# Patient Record
Sex: FEMALE | Race: BLACK OR AFRICAN AMERICAN | NOT HISPANIC OR LATINO | URBAN - METROPOLITAN AREA
[De-identification: names, ages, dates, MRNs, and addresses within clinical notes are randomized per-mention and may not be internally consistent; named-entity substitution may affect disease eponyms.]

---

## 2018-11-17 ENCOUNTER — EMERGENCY (EMERGENCY)
Facility: HOSPITAL | Age: 53
LOS: 1 days | Discharge: HOME | End: 2018-11-17
Attending: EMERGENCY MEDICINE | Admitting: EMERGENCY MEDICINE
Payer: SELF-PAY

## 2018-11-17 VITALS
DIASTOLIC BLOOD PRESSURE: 80 MMHG | HEART RATE: 58 BPM | SYSTOLIC BLOOD PRESSURE: 119 MMHG | OXYGEN SATURATION: 98 % | TEMPERATURE: 98 F | RESPIRATION RATE: 16 BRPM

## 2018-11-17 VITALS
RESPIRATION RATE: 17 BRPM | DIASTOLIC BLOOD PRESSURE: 74 MMHG | HEART RATE: 79 BPM | OXYGEN SATURATION: 100 % | SYSTOLIC BLOOD PRESSURE: 127 MMHG | TEMPERATURE: 98 F

## 2018-11-17 PROCEDURE — 99285 EMERGENCY DEPT VISIT HI MDM: CPT | Mod: 25

## 2018-11-17 PROCEDURE — 93010 ELECTROCARDIOGRAM REPORT: CPT

## 2018-11-17 NOTE — ED PROVIDER NOTE - PROGRESS NOTE DETAILS
PA CARMEN: Patient reassessed, sitting comfortably in chair in NAD, denies any complaints. States feeling better, symptoms improved. Discussed with attending, patient can be discharged home to f/u with PCP. The patient was given verbal and written discharge instructions. Specifically, instructions when to return to the ED and when to seek follow-up from their pcp was discussed. Any specialty follow-up was discussed, including how to make an appointment.  Instructions were discussed in simple, plain language and was understood by the patient. The patient understands that should their symptoms worsen or any new symptoms arise, they should return to the ED immediately for further evaluation. All pt's questions were answered. Patient verbalizes understanding. PA CARMEN: Patient reassessed, sitting comfortably in chair in NAD, denies any complaints. States feeling better, symptoms improved. Discussed with attending, patient can be discharged home to f/u with PCP. Recommends increases fiber, more vegetables and fruits, Miralax, metamucil for constipation. The patient was given verbal and written discharge instructions. Specifically, instructions when to return to the ED and when to seek follow-up from their pcp was discussed. Any specialty follow-up was discussed, including how to make an appointment.  Instructions were discussed in simple, plain language and was understood by the patient. The patient understands that should their symptoms worsen or any new symptoms arise, they should return to the ED immediately for further evaluation. All pt's questions were answered. Patient verbalizes understanding.

## 2018-11-17 NOTE — ED ADULT TRIAGE NOTE - CHIEF COMPLAINT QUOTE
Pt states that she had a syncopal episode after having a BM.  Pt states that she has been constipated x 2 days and took a laxative today.  Denies PMH

## 2018-11-17 NOTE — ED ADULT NURSE NOTE - NSIMPLEMENTINTERV_GEN_ALL_ED
Implemented All Universal Safety Interventions:  [place] to call system. Call bell, personal items and telephone within reach. Instruct patient to call for assistance. Room bathroom lighting operational. Non-slip footwear when patient is off stretcher. Physically safe environment: no spills, clutter or unnecessary equipment. Stretcher in lowest position, wheels locked, appropriate side rails in place.

## 2018-11-17 NOTE — ED PROVIDER NOTE - OBJECTIVE STATEMENT
52 yo F, nonsmoker with no significant PMH presents to ER c/o syncope while having a bowel movement today. Pt states she's visiting from [place], has been constipated due to change of water and diet. Reports she took laxative Senna tea and was having a bowel movement, straining hard, feeling faint and sweating, called somebody, and caught her from falling, no head trauma, no seizure activity. denies any fever, chills, n/v/d, chest pain, sob, abdominal pain, headache, neck pain, back pain, dysuria, leg swelling, hx of DVT/PE, or any other complaints. Denies family hx of cardiac dx. 54 yo F, nonsmoker with no significant PMH presents to ER c/o syncope while having a bowel movement today. Pt states she's visiting from [place], has been constipated due to change of water and diet. Reports she took laxative Senna tea and was having a bowel movement, straining hard, feeling faint and sweating, called somebody, and caught her from falling, no head trauma, no seizure activity. denies any fever, chills, n/v/d, chest pain, sob, abdominal pain, headache, neck pain, back pain, dysuria, leg swelling, hx of DVT/PE, or any other complaints. Denies family hx of cardiac dx.  Attending - Agree with above.  I evaluated patient myself.  54 y/o F with friend at bedside.  Reports constipation without BM since Thursday, so took laxative today.  Subsequently developed abd cramping, and was sitting on toilet, straining to have BM, when she developed sensation of nausea, diaphoresis, and lightheadedness.  Called for friend who witnessed syncope.  Assisted by friend.  No head trauma or hard fall to ground.  911 called.  Reported syncopal episode lasted 5 mins.  No preceding chest pain.  Feels well now.  No abd pain.  No lightheadedness or chest pain. 54 yo F, nonsmoker with no significant PMH presents to ER c/o syncope while having a bowel movement today. Pt states she's visiting from [place], has been constipated due to change of water and diet. Reports she took laxative Senna tea and was having a bowel movement, straining hard, feeling faint and sweating, called somebody, and caught her from falling, no head trauma, no seizure activity. Admits small amount of bowel movements. denies any fever, chills, n/v/d, chest pain, sob, abdominal pain, headache, neck pain, back pain, dysuria, leg swelling, hx of DVT/PE, or any other complaints. Denies family hx of cardiac dx.  Attending - Agree with above.  I evaluated patient myself.  54 y/o F with friend at bedside.  Reports constipation without BM since Thursday, so took laxative today.  Subsequently developed abd cramping, and was sitting on toilet, straining to have BM, when she developed sensation of nausea, diaphoresis, and lightheadedness.  Called for friend who witnessed syncope.  Assisted by friend.  No head trauma or hard fall to ground.  911 called.  Reported syncopal episode lasted 5 mins.  No preceding chest pain.  Feels well now.  No abd pain.  No lightheadedness or chest pain.

## 2018-11-17 NOTE — ED PROVIDER NOTE - NONTENDER LOCATION
right lower quadrant/periumbilical/left costovertebral angle/left upper quadrant/umbilical/right costovertebral angle/left lower quadrant/suprapubic/right upper quadrant

## 2018-11-17 NOTE — ED ADULT NURSE NOTE - OBJECTIVE STATEMENT
rec'd pt. a&ox3, came in s/p a syncopal episode today. as per pt, she was straining, having BM, felt weak and fainted. pt. currently a&ox3, feels better, seen by MD. to be discharge.

## 2018-11-17 NOTE — ED PROVIDER NOTE - PHYSICAL EXAMINATION
ATTENDING PHYSICAL EXAM DR. Livingston ***GEN - NAD; well appearing; A+O x3 ***HEAD - NC/AT ***EYES/NOSE - PERRL, EOMI, mucous membranes moist, no discharge ***THROAT: Oral cavity and pharynx normal. No inflammation, swelling, exudate, or lesions.  ***NECK: Neck supple, non-tender without lymphadenopathy, no masses, no JVD.   ***PULMONARY - CTA b/l, symmetric breath sounds. ***CARDIAC -s1s2, RRR, no M,R,G  ***ABDOMEN - +BS, ND, NT, soft, no guarding, no rebound, no masses   ***BACK - no CVA tenderness, Normal  spine ***EXTREMITIES - symmetric pulses, 2+ dp, capillary refill < 2 seconds, no clubbing, no cyanosis, no edema ***SKIN - no rash or bruising   ***NEUROLOGIC - alert, CN 2-12 intact, reflexes nl, sensation nl, coordination nl, finger to nose nl, romberg negative, motor 5/5 RUE/LUE/RLE/LLE/EHL/Plantar flexion, no pronator drift, gait nl

## 2018-11-17 NOTE — ED PROVIDER NOTE - MEDICAL DECISION MAKING DETAILS
54 yo F, nonsmoker with no significant PMH presents to ER c/o syncope while having a bowel movement today. Well appearing female, s/p syncope while straining on defecation, no seizure activity, had food 30 min prior, no chest pain, no sob, no back pain, no head trauma, EKG non-ischemic. No family hx of cardiac disease, no signs of DVT. 54 yo F, nonsmoker with no significant PMH presents to ER c/o syncope while having a bowel movement today. Well appearing female, s/p syncope while straining on defecation, no seizure activity, had food 30 min prior, no chest pain, no sob, no back pain, no head trauma, No family hx of cardiac disease. Likely vasal vagal. EKG non-ischemic, no arrythmia. Plan: D/c to f/u with PCP. 54 yo F, nonsmoker with no significant PMH presents to ER c/o syncope while having a bowel movement today. Well appearing female, s/p syncope while straining on defecation, no seizure activity, had food 30 min prior, no chest pain, no sob, no back pain, no head trauma, No family hx of cardiac disease. Likely vasal vagal. EKG non-ischemic, no arrythmia. Plan: ekg. f/u with PCP. 52 yo F, nonsmoker with no significant PMH presents to ER c/o syncope while having a bowel movement today. Well appearing female, s/p syncope while straining on defecation, no seizure activity, had food 30 min prior, no chest pain, no sob, no back pain, no head trauma, No family hx of cardiac disease. Likely vasal vagal. . Plan: check EKG.

## 2022-01-04 NOTE — ED PROVIDER NOTE - RESPIRATORY NEGATIVE STATEMENT, MLM
Abdomen , soft, nontender, nondistended , no guarding or rigidity , no masses palpable , normal bowel sounds , Liver and Spleen , no hepatomegaly present , no hepatosplenomegaly , liver nontender , spleen not palpable
no chest pain, no cough, and no shortness of breath.